# Patient Record
Sex: FEMALE | Race: WHITE | Employment: OTHER | URBAN - NONMETROPOLITAN AREA
[De-identification: names, ages, dates, MRNs, and addresses within clinical notes are randomized per-mention and may not be internally consistent; named-entity substitution may affect disease eponyms.]

---

## 2023-03-17 ENCOUNTER — APPOINTMENT (OUTPATIENT)
Dept: GENERAL RADIOLOGY | Age: 88
End: 2023-03-17
Payer: MEDICARE

## 2023-03-17 ENCOUNTER — HOSPITAL ENCOUNTER (EMERGENCY)
Age: 88
Discharge: HOME | End: 2023-03-17
Attending: EMERGENCY MEDICINE
Payer: MEDICARE

## 2023-03-17 VITALS
OXYGEN SATURATION: 99 % | TEMPERATURE: 98.3 F | SYSTOLIC BLOOD PRESSURE: 127 MMHG | RESPIRATION RATE: 18 BRPM | HEART RATE: 74 BPM | WEIGHT: 115 LBS | HEIGHT: 64 IN | DIASTOLIC BLOOD PRESSURE: 60 MMHG | BODY MASS INDEX: 19.63 KG/M2

## 2023-03-17 DIAGNOSIS — E86.0 DEHYDRATION: ICD-10-CM

## 2023-03-17 DIAGNOSIS — I95.1 ORTHOSTASIS: ICD-10-CM
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
## 2023-03-17 LAB
ALBUMIN SERPL BCG-MCNC: 4.1 G/DL (ref 3.5–5.1)
ALBUMIN SERPL BCG-MCNC: 4.2 G/DL (ref 3.5–5.1)
ALP SERPL-CCNC: 69 U/L (ref 38–126)
ALP SERPL-CCNC: 71 U/L (ref 38–126)
ALT SERPL W/O P-5'-P-CCNC: 11 U/L (ref 11–66)
ALT SERPL W/O P-5'-P-CCNC: 11 U/L (ref 11–66)
ANION GAP SERPL CALC-SCNC: 11 MEQ/L (ref 8–16)
AST SERPL-CCNC: 20 U/L (ref 5–40)
AST SERPL-CCNC: 21 U/L (ref 5–40)
BACTERIA: ABNORMAL
BASOPHILS ABSOLUTE: 0 THOU/MM3 (ref 0–0.1)
BASOPHILS NFR BLD AUTO: 0.1 %
BILIRUB CONJ SERPL-MCNC: < 0.2 MG/DL (ref 0–0.3)
BILIRUB SERPL-MCNC: 0.6 MG/DL (ref 0.3–1.2)
BILIRUB SERPL-MCNC: 0.7 MG/DL (ref 0.3–1.2)
BILIRUB UR QL STRIP: NEGATIVE
BUN SERPL-MCNC: 15 MG/DL (ref 7–22)
CALCIUM SERPL-MCNC: 9.3 MG/DL (ref 8.5–10.5)
CASTS #/AREA URNS LPF: ABNORMAL /LPF
CASTS #/AREA URNS LPF: ABNORMAL /LPF
CHARACTER UR: ABNORMAL
CHARCOAL URNS QL MICRO: ABNORMAL
CHLORIDE SERPL-SCNC: 97 MEQ/L (ref 98–111)
CO2 SERPL-SCNC: 25 MEQ/L (ref 23–33)
COLOR UR: YELLOW
CREAT SERPL-MCNC: 0.7 MG/DL (ref 0.4–1.2)
CRYSTALS URNS QL MICRO: ABNORMAL
DEPRECATED RDW RBC AUTO: 45.1 FL (ref 35–45)
EOSINOPHIL NFR BLD AUTO: 0.4 %
EOSINOPHILS ABSOLUTE: 0 THOU/MM3 (ref 0–0.4)
EPITHELIAL CELLS, UA: ABNORMAL /HPF
ERYTHROCYTE [DISTWIDTH] IN BLOOD BY AUTOMATED COUNT: 13.2 % (ref 11.5–14.5)
GFR SERPL CREATININE-BSD FRML MDRD: > 60 ML/MIN/1.73M2
GLUCOSE SERPL-MCNC: 127 MG/DL (ref 70–108)
GLUCOSE UR QL STRIP.AUTO: NEGATIVE MG/DL
HCT VFR BLD AUTO: 39.3 % (ref 37–47)
HGB BLD-MCNC: 13.2 GM/DL (ref 12–16)
HGB UR QL STRIP.AUTO: NEGATIVE
IMM GRANULOCYTES # BLD AUTO: 0.03 THOU/MM3 (ref 0–0.07)
IMM GRANULOCYTES NFR BLD AUTO: 0.4 %
KETONES UR QL STRIP.AUTO: ABNORMAL
LEUKOCYTE ESTERASE UR QL STRIP.AUTO: ABNORMAL
LIPASE SERPL-CCNC: 25.1 U/L (ref 5.6–51.3)
LYMPHOCYTES ABSOLUTE: 0.5 THOU/MM3 (ref 1–4.8)
LYMPHOCYTES NFR BLD AUTO: 7 %
MCH RBC QN AUTO: 30.6 PG (ref 26–33)
MCHC RBC AUTO-ENTMCNC: 33.6 GM/DL (ref 32.2–35.5)
MCV RBC AUTO: 91.2 FL (ref 81–99)
MONOCYTES ABSOLUTE: 0.3 THOU/MM3 (ref 0.4–1.3)
MONOCYTES NFR BLD AUTO: 4.2 %
NEUTROPHILS NFR BLD AUTO: 87.9 %
NITRITE UR QL STRIP.AUTO: NEGATIVE
NRBC BLD AUTO-RTO: 0 /100 WBC
OSMOLALITY SERPL CALC.SUM OF ELEC: 268.8 MOSMOL/KG (ref 275–300)
PH UR STRIP.AUTO: 7 [PH] (ref 5–9)
PLATELET # BLD AUTO: 231 THOU/MM3 (ref 130–400)
PMV BLD AUTO: 9.3 FL (ref 9.4–12.4)
POTASSIUM SERPL-SCNC: 4.2 MEQ/L (ref 3.5–5.2)
PROT SERPL-MCNC: 7.3 G/DL (ref 6.1–8)
PROT SERPL-MCNC: 7.6 G/DL (ref 6.1–8)
PROT UR STRIP.AUTO-MCNC: 30 MG/DL
RBC # BLD AUTO: 4.31 MILL/MM3 (ref 4.2–5.4)
RBC #/AREA URNS HPF: ABNORMAL /HPF
RENAL EPI CELLS #/AREA URNS HPF: ABNORMAL /[HPF]
SEGMENTED NEUTROPHILS ABSOLUTE COUNT: 5.9 THOU/MM3 (ref 1.8–7.7)
SODIUM SERPL-SCNC: 133 MEQ/L (ref 135–145)
SP GR UR REFRACT.AUTO: 1.03 (ref 1–1.03)
UROBILINOGEN UR QL STRIP.AUTO: 1 EU/DL (ref 0–1)
WBC # BLD AUTO: 6.7 THOU/MM3 (ref 4.8–10.8)
WBC #/AREA URNS HPF: ABNORMAL /HPF
YEAST LIKE FUNGI URNS QL MICRO: ABNORMAL

## 2023-03-17 PROCEDURE — 96360 HYDRATION IV INFUSION INIT: CPT

## 2023-03-17 PROCEDURE — 83690 ASSAY OF LIPASE: CPT

## 2023-03-17 PROCEDURE — 85025 COMPLETE CBC W/AUTO DIFF WBC: CPT

## 2023-03-17 PROCEDURE — 99285 EMERGENCY DEPT VISIT HI MDM: CPT

## 2023-03-17 PROCEDURE — 93005 ELECTROCARDIOGRAM TRACING: CPT | Performed by: EMERGENCY MEDICINE

## 2023-03-17 PROCEDURE — 36415 COLL VENOUS BLD VENIPUNCTURE: CPT

## 2023-03-17 PROCEDURE — 2580000003 HC RX 258: Performed by: EMERGENCY MEDICINE

## 2023-03-17 PROCEDURE — 81001 URINALYSIS AUTO W/SCOPE: CPT

## 2023-03-17 PROCEDURE — 72170 X-RAY EXAM OF PELVIS: CPT

## 2023-03-17 PROCEDURE — 80053 COMPREHEN METABOLIC PANEL: CPT

## 2023-03-17 PROCEDURE — 71045 X-RAY EXAM CHEST 1 VIEW: CPT

## 2023-03-17 RX ORDER — 0.9 % SODIUM CHLORIDE 0.9 %
1000 INTRAVENOUS SOLUTION INTRAVENOUS ONCE
Status: COMPLETED | OUTPATIENT
Start: 2023-03-17 | End: 2023-03-17

## 2023-03-17 RX ORDER — SERTRALINE HYDROCHLORIDE 25 MG/1
25 TABLET, FILM COATED ORAL DAILY
COMMUNITY

## 2023-03-17 RX ORDER — FAMOTIDINE 20 MG/1
20 TABLET, FILM COATED ORAL 2 TIMES DAILY
COMMUNITY

## 2023-03-17 RX ORDER — LOSARTAN POTASSIUM 100 MG/1
100 TABLET ORAL DAILY
COMMUNITY

## 2023-03-17 RX ORDER — SIMVASTATIN 40 MG
40 TABLET ORAL NIGHTLY
COMMUNITY

## 2023-03-17 RX ORDER — DONEPEZIL HYDROCHLORIDE 10 MG/1
10 TABLET, FILM COATED ORAL NIGHTLY
COMMUNITY

## 2023-03-17 RX ADMIN — SODIUM CHLORIDE 1000 ML: 9 INJECTION, SOLUTION INTRAVENOUS at 20:20

## 2023-03-18 LAB
EKG ATRIAL RATE: 78 BPM
EKG P AXIS: 86 DEGREES
EKG P-R INTERVAL: 136 MS
EKG Q-T INTERVAL: 392 MS
EKG QRS DURATION: 66 MS
EKG QTC CALCULATION (BAZETT): 446 MS
EKG R AXIS: 85 DEGREES
EKG T AXIS: 84 DEGREES
EKG VENTRICULAR RATE: 78 BPM

## 2023-03-18 PROCEDURE — 93010 ELECTROCARDIOGRAM REPORT: CPT | Performed by: INTERNAL MEDICINE

## 2023-03-18 ASSESSMENT — ENCOUNTER SYMPTOMS: VOMITING: 1

## 2023-03-18 NOTE — ED NOTES
Patient resting in bed. Respirations easy and unlabored. No distress noted. Call light within reach. Pt ambulated to BR by wheelchair. Some unsteady gait while standing.    UA collected  IVF started     Jeffry Fernandez RN  03/17/23 2023

## 2023-03-18 NOTE — ED PROVIDER NOTES
[facility] ENCOUNTER          Pt Name: Warden Silva  MRN: [id number]  [place] 1935  Date of evaluation: 3/17/2023  Emergency Physician: Saurav Haq, [facility]       Chief Complaint   Patient presents with    Fall     History obtained from the patient. HISTORY OF PRESENT ILLNESS    HPI  Warden Silva is a 80 y.o. female who presents to the emergency department for evaluation of fall. Patient resides at assisted living. She reports she was feeling lightheaded getting up to go the the bathroom this afternoon. Family was visiting they report the patient had gotten up then was wobbly and clammy. They states she had to be helped down so she did not fall. She then vomited once. Since that time patient reports she is feeling better. No abdominal pain. No chest pain. No palpitations. No Headache. The patient has no other acute complaints at this time. REVIEW OF SYSTEMS   Review of Systems   Unable to perform ROS: Dementia   Gastrointestinal:  Positive for vomiting. Neurological:  Positive for syncope and weakness. PAST MEDICAL AND SURGICAL HISTORY   No past medical history on file. No past surgical history on file. MEDICATIONS     Current Facility-Administered Medications:     0.9 % sodium chloride bolus, 1,000 mL, IntraVENous, Once, Saurav Haq, DO  No current outpatient medications on file. SOCIAL HISTORY     Social History     Social History Narrative    Not on file            ALLERGIES     Allergies   Allergen Reactions    Fosamax [Alendronate] Other (See Comments)    Sulfa Antibiotics          FAMILY HISTORY   No family history on file.       PHYSICAL EXAM     ED Triage Vitals [03/17/23 1848]   BP Temp Temp src Heart Rate Resp SpO2 Height Weight   (!) 123/57 98.3 °F (36.8 °C) -- 70 17 99 % 5' 4\" (1.626 m) 115 lb (52.2 kg)         Additional Vital Signs:  Vitals:    03/17/23 1848   BP: (!) 123/57   Pulse: 70 Resp: 17   Temp: 98.3 °F (36.8 °C)   SpO2: 99%       Physical Exam  Constitutional:       General: She is not in acute distress. Appearance: She is well-developed. She is not diaphoretic. HENT:      Head: Normocephalic and atraumatic. Mouth/Throat:      Mouth: Mucous membranes are dry. Eyes:      General:         Right eye: No discharge. Left eye: No discharge. Conjunctiva/sclera: Conjunctivae normal.      Pupils: Pupils are equal, round, and reactive to light. Neck:      Thyroid: No thyromegaly. Vascular: No JVD. Cardiovascular:      Rate and Rhythm: Normal rate and regular rhythm. Pulses: Normal pulses. Heart sounds: Normal heart sounds. Pulmonary:      Effort: Pulmonary effort is normal. No respiratory distress. Breath sounds: Normal breath sounds. Abdominal:      General: Bowel sounds are normal. There is no distension. Palpations: Abdomen is soft. Tenderness: There is no abdominal tenderness. Musculoskeletal:         General: No tenderness or signs of injury. Normal range of motion. Cervical back: Normal range of motion and neck supple. No rigidity or tenderness. Lymphadenopathy:      Cervical: No cervical adenopathy. Skin:     General: Skin is warm and dry. Capillary Refill: Capillary refill takes less than 2 seconds. Findings: No rash. Neurological:      General: No focal deficit present. Mental Status: She is alert and oriented to person, place, and time. She is not disoriented. GCS: GCS eye subscore is 4. GCS verbal subscore is 5. GCS motor subscore is 6. Cranial Nerves: No cranial nerve deficit. Sensory: No sensory deficit. Motor: No abnormal muscle tone or seizure activity. Gait: Gait normal.      Comments: Patient ambulated after IV hydration with a steady gait.           INITIAL IMPRESSION AND DIFFERENTIALS   Initial Assessment: Given the patient's above chief complaint and findings on history and physical examination, I thought it was appropriate to consider the following emergency medical conditions:Dehydration, orthostatic hypotension, biliary colic, cholecystitis, Acs, AAA, TIA, CVA, anemia, and others. Although, some of these diagnoses are unlikely they were considered in my medical decision making. Plan: CBC, BMP, ECG, Troponin, Hepatic function, lipase Symptomatic treatment with IVF and reassess     Chronic Conditions considered: There is no problem list on file for this patient. ED RESULTS   Laboratory results:  Labs Reviewed   CBC WITH AUTO DIFFERENTIAL - Abnormal; Notable for the following components:       Result Value    RDW-SD 45.1 (*)     MPV 9.3 (*)     Lymphocytes Absolute 0.5 (*)     Monocytes Absolute 0.3 (*)     All other components within normal limits   COMPREHENSIVE METABOLIC PANEL W/ REFLEX TO MG FOR LOW K - Abnormal; Notable for the following components:    Glucose 127 (*)     Sodium 133 (*)     Chloride 97 (*)     All other components within normal limits   OSMOLALITY - Abnormal; Notable for the following components:    Osmolality Calc 268.8 (*)     All other components within normal limits   ANION GAP   GLOMERULAR FILTRATION RATE, ESTIMATED   URINALYSIS   HEPATIC FUNCTION PANEL   LIPASE       Radiologic studies results:  XR PELVIS (1-2 VIEWS)   Final Result    No definite fracture noted. Please correlate clinically. .               **This report has been created using voice recognition software. It may contain minor errors which are inherent in voice recognition technology. **      Final report electronically signed by DR Satya Thompson on 3/17/2023 8:43 PM      XR CHEST PORTABLE   Final Result   No acute cardiopulmonary disease. **This report has been created using voice recognition software. It may contain minor errors which are inherent in voice recognition technology. **      Final report electronically signed by DR Satya Thompson on 3/17/2023 8:40 PM ED Medications administered this visit:   Medications   0.9 % sodium chloride bolus (has no administration in time range)         [facility]     ED Course as of 03/18/23 1338   Sat Mar 18, 2023   1334 WBC, UA: 5-9 [DD]   1334 Epithelial Cells, UA: 3-5 [DD]   1334 Lipase: 25.1 [DD]   1334 BILIRUBIN TOTAL: 0.6 [DD]   1334 WBC: 6.7 [DD]   1334 Creatinine: 0.7 [DD]   1334 Sodium(!): 133  Mild dehdyration [DD]      ED Course User Index  [DD] Anthony Filter, DO   Orthostatic vitals were positive. They improved with IV hydration. Patient ambulatory with a steady Gait. Lab work was reassuring. Patient PO challenge with difficulty   Available laboratory and imaging results were independently reviewed and clinically correlated. Decision Rules/Clinical Scores utilized:   none . Code Status:  Not addressed during this ED visit    [facility] Social determinants of health considered to potentially effect treatment and/or disposition plan:  Older age  [employer] 2030, [address] [facility]. Department of Health and Human Services, Office of Disease Prevention and Health Promotion. Medical Comorbidities impacting treatment or disposition:  Not Applicable. No past medical history on file. Consultants: Not Applicable. Final Assessment and Plan:   81 yo female with orthostatic syncope. Improved with ED treatment. Ambulates without difficulty. Patient with 1 episode of vomiting. No definitive cause. Tolerating PO intake. The diagnosis, extensive differential diagnosis, plan of care, laboratory, and imaging findings were discussed at the bedside. All questions and concerns were addressed at the time of the encounter.   MEDICATION CHANGES     DISCHARGE MEDICATIONS:  Discharge Medication List as of 3/17/2023  9:51 PM               FINAL DISPOSITION     Final diagnoses:   Fall, initial encounter   Dehydration   Orthostasis     Condition: condition: good  Dispo: Discharge to nursing home    PATIENT REFERRED TO:  No follow-up provider specified. Critical Care Time   CRITICAL CARE:  None    PROCEDURES: (None if blank)  Procedures:   Medical Decision Making      This transcription was electronically signed. Parts of this transcriptions may have been dictated by use of voice recognition software and electronically transcribed, and parts may have been transcribed with the assistance of an ED scribe. The transcription may contain errors not detected in proofreading.     Electronically Signed: Teresa Gilliam DO, 03/17/23, 8:12 PM         Teresa Gilliam DO  03/18/23 9941

## 2023-03-18 NOTE — ED NOTES
PT ambulated down [place] and back, steady gait noted, pt denies any light headed or dizziness      Evette Hernández RN  03/17/23 2129

## 2023-03-18 NOTE — DISCHARGE INSTRUCTIONS
You should drink 3 to 4 --16.9 ounce bottles  of water per day. Return to the ED if you have any new or changing symptoms such as lightheadedness, feeling faint, fall, headache, numbness, tingling, nausea, vomiting, abdominal pain, or you have any other concerns.